# Patient Record
Sex: MALE | URBAN - METROPOLITAN AREA
[De-identification: names, ages, dates, MRNs, and addresses within clinical notes are randomized per-mention and may not be internally consistent; named-entity substitution may affect disease eponyms.]

---

## 2024-06-03 ENCOUNTER — HOSPITAL ENCOUNTER (OUTPATIENT)
Dept: RADIOLOGY | Facility: EXTERNAL LOCATION | Age: 34
Discharge: HOME | End: 2024-06-03

## 2024-06-03 DIAGNOSIS — M25.562 ACUTE PAIN OF LEFT KNEE: ICD-10-CM

## 2025-02-25 ENCOUNTER — OFFICE VISIT (OUTPATIENT)
Dept: URGENT CARE | Age: 35
End: 2025-02-25
Payer: COMMERCIAL

## 2025-02-25 VITALS
DIASTOLIC BLOOD PRESSURE: 89 MMHG | SYSTOLIC BLOOD PRESSURE: 132 MMHG | RESPIRATION RATE: 16 BRPM | OXYGEN SATURATION: 97 % | WEIGHT: 217 LBS | TEMPERATURE: 97.7 F | HEART RATE: 104 BPM

## 2025-02-25 DIAGNOSIS — J02.9 ACUTE VIRAL PHARYNGITIS: ICD-10-CM

## 2025-02-25 LAB
POC RAPID MONO: NEGATIVE
POC RAPID STREP: NEGATIVE

## 2025-02-25 NOTE — PROGRESS NOTES
Subjective   Patient ID: Ramon Gomez is a 35 y.o. male. They present today with a chief complaint of Sore Throat.    History of Present Illness  Patient reports symptoms present for ~5 days  Endorses sore throat  Reports white spots in his throat which made him concerned for possible strep  Reports pain with swallowing  GF's child goes to day care  GF is currently sick with sinus symptoms  Denies hx of mono    Past Medical History  Allergies as of 02/25/2025    (No Known Allergies)       (Not in a hospital admission)       No past medical history on file.    No past surgical history on file.     reports that he has never smoked. He has never used smokeless tobacco. He reports that he does not use drugs.                               Objective    Vitals:    02/25/25 0816   BP: 132/89   Pulse: 104   Resp: 16   Temp: 36.5 °C (97.7 °F)   SpO2: 97%   Weight: 98.4 kg (217 lb)     No LMP for male patient.    Physical Exam  Constitutional:       General: He is not in acute distress.     Appearance: Normal appearance. He is not toxic-appearing or diaphoretic.   HENT:      Head: Normocephalic.      Nose: No rhinorrhea.      Mouth/Throat:      Pharynx: Oropharyngeal exudate and posterior oropharyngeal erythema present.   Eyes:      General: No scleral icterus.        Right eye: No discharge.         Left eye: No discharge.      Extraocular Movements: Extraocular movements intact.   Cardiovascular:      Rate and Rhythm: Regular rhythm.      Comments: Borderline tachycardic   Pulmonary:      Effort: Pulmonary effort is normal. No respiratory distress.      Breath sounds: No wheezing.   Musculoskeletal:      Cervical back: Normal range of motion.   Neurological:      Mental Status: He is alert.   Psychiatric:         Mood and Affect: Mood normal.         Behavior: Behavior normal.         Thought Content: Thought content normal.      Comments: Pleasant         Procedures    Point of Care Test & Imaging Results from this  visit:  Results for orders placed or performed in visit on 02/25/25   POCT rapid strep A manually resulted    Collection Time: 02/25/25  8:34 AM   Result Value Ref Range    POC Rapid Strep Negative Negative   POCT Infectious mononucleosis antibody manually resulted    Collection Time: 02/25/25  8:50 AM   Result Value Ref Range    POC Rapid Mono Negative Negative       Diagnostic study results (if any) were reviewed by Iraj Coombs MD.    Assessment/Plan   Allergies, medications, history, and pertinent labs/EKGs/Imaging reviewed by Iraj Coombs MD.     Medical Decision Making:    Patient's sore throat is likely 2/2 viral etiology. Rapid strep test is negative along with a negative monospot. Encourage supportive care with buckwheat honey & lemon juice, NSAIDs & Tylenol PRN, salt water gargles etc. Follow up strep PCR. Follow up as needed with PCP/UC if symptoms fail to improve.     Orders and Diagnoses  Diagnoses and all orders for this visit:  Acute viral pharyngitis  -     POCT rapid strep A manually resulted  -     POCT Infectious mononucleosis antibody manually resulted      Patient disposition: Home      Medical Admin Record      Follow Up Instructions  No follow-ups on file.    Electronically signed by Iraj Coombs MD  8:50 AM